# Patient Record
Sex: MALE | Race: WHITE | HISPANIC OR LATINO | Employment: FULL TIME | URBAN - METROPOLITAN AREA
[De-identification: names, ages, dates, MRNs, and addresses within clinical notes are randomized per-mention and may not be internally consistent; named-entity substitution may affect disease eponyms.]

---

## 2020-06-09 ENCOUNTER — OCCUPATIONAL HEALTH (OUTPATIENT)
Dept: URGENT CARE | Facility: CLINIC | Age: 20
End: 2020-06-09

## 2020-06-09 VITALS
WEIGHT: 144 LBS | DIASTOLIC BLOOD PRESSURE: 70 MMHG | HEART RATE: 72 BPM | TEMPERATURE: 98 F | HEIGHT: 66 IN | SYSTOLIC BLOOD PRESSURE: 108 MMHG | BODY MASS INDEX: 23.14 KG/M2

## 2020-06-09 DIAGNOSIS — Z02.1 PRE-EMPLOYMENT EXAMINATION: Primary | ICD-10-CM

## 2020-06-09 PROCEDURE — 80305 DRUG TEST PRSMV DIR OPT OBS: CPT | Mod: S$GLB,,, | Performed by: PHYSICIAN ASSISTANT

## 2020-06-09 PROCEDURE — 99499 UNLISTED E&M SERVICE: CPT | Mod: S$GLB,,, | Performed by: PHYSICIAN ASSISTANT

## 2020-06-09 PROCEDURE — 80305 OOH NON-DOT DRUG SCREEN: ICD-10-PCS | Mod: S$GLB,,, | Performed by: PHYSICIAN ASSISTANT

## 2020-06-09 PROCEDURE — 99499 PHYSICAL, BASIC COMPLEXITY: ICD-10-PCS | Mod: S$GLB,,, | Performed by: PHYSICIAN ASSISTANT

## 2020-10-27 ENCOUNTER — OFFICE VISIT (OUTPATIENT)
Dept: URGENT CARE | Facility: CLINIC | Age: 20
End: 2020-10-27
Payer: OTHER MISCELLANEOUS

## 2020-10-27 VITALS
SYSTOLIC BLOOD PRESSURE: 134 MMHG | DIASTOLIC BLOOD PRESSURE: 82 MMHG | WEIGHT: 160 LBS | HEIGHT: 65 IN | OXYGEN SATURATION: 98 % | HEART RATE: 95 BPM | TEMPERATURE: 98 F | RESPIRATION RATE: 16 BRPM | BODY MASS INDEX: 26.66 KG/M2

## 2020-10-27 DIAGNOSIS — M54.9 DORSALGIA, UNSPECIFIED: ICD-10-CM

## 2020-10-27 DIAGNOSIS — M54.32 SCIATICA, LEFT SIDE: Primary | ICD-10-CM

## 2020-10-27 PROCEDURE — 99203 PR OFFICE/OUTPT VISIT, NEW, LEVL III, 30-44 MIN: ICD-10-PCS | Mod: S$GLB,,, | Performed by: PHYSICIAN ASSISTANT

## 2020-10-27 PROCEDURE — 72110 XR LUMBAR SPINE COMPLETE 5 VIEW: ICD-10-PCS | Mod: FY,S$GLB,, | Performed by: RADIOLOGY

## 2020-10-27 PROCEDURE — 99203 OFFICE O/P NEW LOW 30 MIN: CPT | Mod: S$GLB,,, | Performed by: PHYSICIAN ASSISTANT

## 2020-10-27 PROCEDURE — 72110 X-RAY EXAM L-2 SPINE 4/>VWS: CPT | Mod: FY,S$GLB,, | Performed by: RADIOLOGY

## 2020-10-27 NOTE — LETTER
[facility]  [address], [address]  [facility] [address]  Phone: [phone]  Fax: [phone]  [facility]: [email or web address]    Pt Name: Hernandez Richmond  Injury Date: 09/20/2020   Employee ID: [id number] Date of First Treatment: 10/27/2020   Company: [employer]      Appointment Time: 10:35 AM Arrived: 10:48am   Provider: Endy Paige PA-C Time Out:1:30pm     Office Treatment:   1. Sciatica, left side    2. Dorsalgia, unspecified          Patient Instructions: Daily home exercises/warm soaks(You may take over-the-counter ibuprofen and/or Tylenol as needed for pain.  Follow-up with your doctor of choice for evaluation and management.)    Restrictions: Regular Duty, Discharged from Occupational Health     Return Appointment: Discharged  SB

## 2020-10-27 NOTE — PROGRESS NOTES
Subjective:       Patient ID: Hernandez Richmond is a 20 y.o. male.    Chief Complaint: Leg Pain (LT)    New  LT Leg Pain ( DOI 09- ) No known incident to cause leg pain, just started hurting. Pain score 8/10 with complaints of Constant Spasm which increases with prolonged standing, Pain with walking, No numbness/tingling or swelling. SH    20-year-old male presents with left lateral hip and left leg pain for approximately 1 month.  Patient denies trauma or any activity inciting pain.  Patient also reports intermittent tingling of the left foot.  Patient denies back pain, bowel or bladder incontinence or saddle paresthesias.  Patient denies previous back or left leg injury.  Patient has taken no medicines for the pain.  Patient works for [employer].    Leg Pain   The incident occurred more than 1 week ago. The incident occurred at work. There was no injury mechanism. The pain is present in the left leg. The pain is at a severity of 8/10. The pain is mild. The pain has been constant since onset. Associated symptoms include tingling (left foot). Pertinent negatives include no numbness. He reports no foreign bodies present. The symptoms are aggravated by movement and weight bearing.       Constitution: Negative for chills, fatigue and fever.   HENT: Negative for congestion and sore throat.    Neck: Negative for painful lymph nodes.   Cardiovascular: Negative for chest pain and leg swelling.   Eyes: Negative for double vision and blurred vision.   Respiratory: Negative for cough and shortness of breath.    Gastrointestinal: Negative for nausea, vomiting and diarrhea.   Genitourinary: Negative for dysuria, frequency and urgency.   Musculoskeletal: Positive for pain. Negative for trauma, joint pain, joint swelling, back pain, muscle cramps and muscle ache.   Skin: Negative for color change, pale and rash.   Allergic/Immunologic: Negative for seasonal allergies.   Neurological: Negative for dizziness, history  of vertigo, light-headedness, passing out, headaches, numbness and tingling.   Hematologic/Lymphatic: Negative for swollen lymph nodes, easy bruising/bleeding and history of blood clots. Does not bruise/bleed easily.   Psychiatric/Behavioral: Negative for nervous/anxious, sleep disturbance and depression. The patient is not nervous/anxious.         Objective:      Physical Exam  Vitals signs and nursing note reviewed. Exam conducted with a chaperone present.   Constitutional:       General: He is not in acute distress.     Appearance: Normal appearance. He is well-developed.   HENT:      Head: Normocephalic and atraumatic.      Right Ear: Hearing and external ear normal.      Left Ear: Hearing and external ear normal.      Nose: Nose normal. No nasal deformity.   Eyes:      General: Lids are normal.      Conjunctiva/sclera: Conjunctivae normal.      Right eye: Right conjunctiva is not injected.      Left eye: Left conjunctiva is not injected.   Neck:      Musculoskeletal: Normal range of motion. No spinous process tenderness or muscular tenderness.      Trachea: Trachea normal.   Cardiovascular:      Pulses: Normal pulses.           Dorsalis pedis pulses are 2+ on the right side and 2+ on the left side.        Posterior tibial pulses are 2+ on the right side and 2+ on the left side.   Pulmonary:      Effort: Pulmonary effort is normal. No respiratory distress.      Breath sounds: No stridor.   Musculoskeletal:      Left hip: He exhibits normal range of motion, normal strength and no tenderness.      Left knee: He exhibits normal range of motion. No tenderness found.      Cervical back: Normal.      Thoracic back: Normal.      Lumbar back: He exhibits normal range of motion, no tenderness, no deformity and normal pulse.      Left lower leg: He exhibits no tenderness and no bony tenderness.      Comments: Pain in left lateral hip and left leg with SLR.    Skin:     General: Skin is warm and dry.      Findings: No  abrasion or bruising.   Neurological:      Mental Status: He is alert.      GCS: GCS eye subscore is 4. GCS verbal subscore is 5. GCS motor subscore is 6.      Sensory: No sensory deficit.      Deep Tendon Reflexes:      Reflex Scores:       Patellar reflexes are 3+ on the right side and 3+ on the left side.       Achilles reflexes are 2+ on the right side and 2+ on the left side.     Comments: SLR positive LLE at 30 degrees.  Negative RLE   Psychiatric:         Attention and Perception: He is attentive.         Speech: Speech normal.         Behavior: Behavior normal.         Thought Content: Thought content normal.           X-ray Lumbar Spine Complete 5 View    Result Date: 10/27/2020  EXAMINATION: XR LUMBAR SPINE COMPLETE 5 VIEW CLINICAL HISTORY: Back pain or radiculopathy, < 6 wks, uncomplicated;WORKERS COMP;Sciatica, left side TECHNIQUE: AP, lateral, spot and bilateral oblique views of the lumbar spine were performed. COMPARISON: None FINDINGS: Vertebral bodies are intact.  Disc spaces are maintained and no bony abnormalities are detected.  There is some pleural thick material in loops of bowel in the pelvis probably representing medication in the colon.     No acute findings Electronically signed by: Endy Farr MD Date:    10/27/2020 Time:    13:07    Assessment:       1. Sciatica, left side    2. Dorsalgia, unspecified        Plan:       There is no link to work related activity causing patient's pain.  Patient instructed to follow with his doctor of choice for evaluation management.     Patient Instructions: Daily home exercises/warm soaks(You may take over-the-counter ibuprofen and/or Tylenol as needed for pain.  Follow-up with your doctor of choice for evaluation and management.)   Restrictions: Regular Duty, Discharged from Occupational Health  Follow up if symptoms worsen or fail to improve.        .

## 2025-05-05 ENCOUNTER — HOSPITAL ENCOUNTER (OUTPATIENT)
Facility: HOSPITAL | Age: 25
Discharge: HOME | End: 2025-05-07
Attending: EMERGENCY MEDICINE | Admitting: SURGERY
Payer: COMMERCIAL

## 2025-05-05 DIAGNOSIS — R10.9 ABDOMINAL PAIN, UNSPECIFIED ABDOMINAL LOCATION: ICD-10-CM

## 2025-05-05 DIAGNOSIS — K37 APPENDICITIS, UNSPECIFIED APPENDICITIS TYPE: Primary | ICD-10-CM

## 2025-05-05 LAB
ABSOLUTE EOSINOPHIL (OHS): 0.01 K/UL
ABSOLUTE MONOCYTE (OHS): 1.1 K/UL (ref 0.3–1)
ABSOLUTE NEUTROPHIL COUNT (OHS): 14.38 K/UL (ref 1.8–7.7)
ALBUMIN SERPL BCP-MCNC: 4.5 G/DL (ref 3.5–5.2)
ALP SERPL-CCNC: 87 UNIT/L (ref 40–150)
ALT SERPL W/O P-5'-P-CCNC: 37 UNIT/L (ref 10–44)
ANION GAP (OHS): 10 MMOL/L (ref 8–16)
AST SERPL-CCNC: 17 UNIT/L (ref 11–45)
BASOPHILS # BLD AUTO: 0.04 K/UL
BASOPHILS NFR BLD AUTO: 0.2 %
BILIRUB SERPL-MCNC: 1 MG/DL (ref 0.1–1)
BILIRUB UR QL STRIP.AUTO: NEGATIVE
BUN SERPL-MCNC: 9 MG/DL (ref 6–20)
CALCIUM SERPL-MCNC: 9.8 MG/DL (ref 8.7–10.5)
CHLORIDE SERPL-SCNC: 103 MMOL/L (ref 95–110)
CLARITY UR: CLEAR
CO2 SERPL-SCNC: 20 MMOL/L (ref 23–29)
COLOR UR AUTO: YELLOW
CREAT SERPL-MCNC: 0.8 MG/DL (ref 0.5–1.4)
ERYTHROCYTE [DISTWIDTH] IN BLOOD BY AUTOMATED COUNT: 12 % (ref 11.5–14.5)
GFR SERPLBLD CREATININE-BSD FMLA CKD-EPI: >60 ML/MIN/1.73/M2
GLUCOSE SERPL-MCNC: 94 MG/DL (ref 70–110)
GLUCOSE UR QL STRIP: NEGATIVE
HCT VFR BLD AUTO: 43.5 % (ref 40–54)
HGB BLD-MCNC: 15.1 GM/DL (ref 14–18)
HGB UR QL STRIP: NEGATIVE
HOLD SPECIMEN: NORMAL
HOLD SPECIMEN: NORMAL
IMM GRANULOCYTES # BLD AUTO: 0.07 K/UL (ref 0–0.04)
IMM GRANULOCYTES NFR BLD AUTO: 0.4 % (ref 0–0.5)
KETONES UR QL STRIP: ABNORMAL
LEUKOCYTE ESTERASE UR QL STRIP: NEGATIVE
LIPASE SERPL-CCNC: 21 U/L (ref 4–60)
LYMPHOCYTES # BLD AUTO: 2.31 K/UL (ref 1–4.8)
MCH RBC QN AUTO: 29.9 PG (ref 27–31)
MCHC RBC AUTO-ENTMCNC: 34.7 G/DL (ref 32–36)
MCV RBC AUTO: 86 FL (ref 82–98)
NITRITE UR QL STRIP: NEGATIVE
NUCLEATED RBC (/100WBC) (OHS): 0 /100 WBC
PH UR STRIP: 6 [PH]
PLATELET # BLD AUTO: 228 K/UL (ref 150–450)
PMV BLD AUTO: 9.4 FL (ref 9.2–12.9)
POTASSIUM SERPL-SCNC: 3.9 MMOL/L (ref 3.5–5.1)
PROT SERPL-MCNC: 8.3 GM/DL (ref 6–8.4)
PROT UR QL STRIP: NEGATIVE
RBC # BLD AUTO: 5.05 M/UL (ref 4.6–6.2)
RELATIVE EOSINOPHIL (OHS): 0.1 %
RELATIVE LYMPHOCYTE (OHS): 12.9 % (ref 18–48)
RELATIVE MONOCYTE (OHS): 6.1 % (ref 4–15)
RELATIVE NEUTROPHIL (OHS): 80.3 % (ref 38–73)
SODIUM SERPL-SCNC: 133 MMOL/L (ref 136–145)
SP GR UR STRIP: 1.02
UROBILINOGEN UR STRIP-ACNC: NEGATIVE EU/DL
WBC # BLD AUTO: 17.91 K/UL (ref 3.9–12.7)

## 2025-05-05 PROCEDURE — G0378 HOSPITAL OBSERVATION PER HR: HCPCS

## 2025-05-05 PROCEDURE — 81003 URINALYSIS AUTO W/O SCOPE: CPT | Performed by: EMERGENCY MEDICINE

## 2025-05-05 PROCEDURE — 25000003 PHARM REV CODE 250: Performed by: EMERGENCY MEDICINE

## 2025-05-05 PROCEDURE — 85025 COMPLETE CBC W/AUTO DIFF WBC: CPT | Performed by: EMERGENCY MEDICINE

## 2025-05-05 PROCEDURE — 80053 COMPREHEN METABOLIC PANEL: CPT | Performed by: EMERGENCY MEDICINE

## 2025-05-05 PROCEDURE — 96365 THER/PROPH/DIAG IV INF INIT: CPT

## 2025-05-05 PROCEDURE — 83690 ASSAY OF LIPASE: CPT | Performed by: EMERGENCY MEDICINE

## 2025-05-05 PROCEDURE — 63600175 PHARM REV CODE 636 W HCPCS: Performed by: EMERGENCY MEDICINE

## 2025-05-05 PROCEDURE — 99285 EMERGENCY DEPT VISIT HI MDM: CPT | Mod: 25

## 2025-05-05 PROCEDURE — 36415 COLL VENOUS BLD VENIPUNCTURE: CPT | Performed by: SURGERY

## 2025-05-05 PROCEDURE — 25500020 PHARM REV CODE 255: Performed by: EMERGENCY MEDICINE

## 2025-05-05 PROCEDURE — 96375 TX/PRO/DX INJ NEW DRUG ADDON: CPT

## 2025-05-05 RX ORDER — ONDANSETRON HYDROCHLORIDE 2 MG/ML
4 INJECTION, SOLUTION INTRAVENOUS EVERY 6 HOURS PRN
Status: DISCONTINUED | OUTPATIENT
Start: 2025-05-05 | End: 2025-05-07 | Stop reason: HOSPADM

## 2025-05-05 RX ORDER — LIDOCAINE HYDROCHLORIDE 10 MG/ML
1 INJECTION, SOLUTION EPIDURAL; INFILTRATION; INTRACAUDAL; PERINEURAL ONCE AS NEEDED
Status: DISCONTINUED | OUTPATIENT
Start: 2025-05-05 | End: 2025-05-07 | Stop reason: HOSPADM

## 2025-05-05 RX ORDER — ACETAMINOPHEN 325 MG/1
650 TABLET ORAL EVERY 6 HOURS
Status: DISCONTINUED | OUTPATIENT
Start: 2025-05-06 | End: 2025-05-06

## 2025-05-05 RX ORDER — ONDANSETRON HYDROCHLORIDE 2 MG/ML
4 INJECTION, SOLUTION INTRAVENOUS EVERY 6 HOURS PRN
Status: DISCONTINUED | OUTPATIENT
Start: 2025-05-05 | End: 2025-05-05

## 2025-05-05 RX ORDER — PROCHLORPERAZINE EDISYLATE 5 MG/ML
5 INJECTION INTRAMUSCULAR; INTRAVENOUS EVERY 6 HOURS PRN
Status: DISCONTINUED | OUTPATIENT
Start: 2025-05-05 | End: 2025-05-07 | Stop reason: HOSPADM

## 2025-05-05 RX ORDER — SODIUM CHLORIDE 9 MG/ML
1000 INJECTION, SOLUTION INTRAVENOUS
Status: COMPLETED | OUTPATIENT
Start: 2025-05-05 | End: 2025-05-05

## 2025-05-05 RX ORDER — MORPHINE SULFATE 4 MG/ML
4 INJECTION, SOLUTION INTRAMUSCULAR; INTRAVENOUS
Refills: 0 | Status: COMPLETED | OUTPATIENT
Start: 2025-05-05 | End: 2025-05-05

## 2025-05-05 RX ORDER — ONDANSETRON HYDROCHLORIDE 2 MG/ML
4 INJECTION, SOLUTION INTRAVENOUS
Status: COMPLETED | OUTPATIENT
Start: 2025-05-05 | End: 2025-05-05

## 2025-05-05 RX ORDER — MORPHINE SULFATE 4 MG/ML
4 INJECTION, SOLUTION INTRAMUSCULAR; INTRAVENOUS EVERY 4 HOURS PRN
Refills: 0 | Status: DISCONTINUED | OUTPATIENT
Start: 2025-05-05 | End: 2025-05-06

## 2025-05-05 RX ORDER — MORPHINE SULFATE 2 MG/ML
2 INJECTION, SOLUTION INTRAMUSCULAR; INTRAVENOUS EVERY 4 HOURS PRN
Status: DISCONTINUED | OUTPATIENT
Start: 2025-05-05 | End: 2025-05-05

## 2025-05-05 RX ORDER — SODIUM CHLORIDE 9 MG/ML
1000 INJECTION, SOLUTION INTRAVENOUS
Status: DISCONTINUED | OUTPATIENT
Start: 2025-05-05 | End: 2025-05-05

## 2025-05-05 RX ORDER — SODIUM CHLORIDE, SODIUM LACTATE, POTASSIUM CHLORIDE, CALCIUM CHLORIDE 600; 310; 30; 20 MG/100ML; MG/100ML; MG/100ML; MG/100ML
INJECTION, SOLUTION INTRAVENOUS CONTINUOUS
Status: DISCONTINUED | OUTPATIENT
Start: 2025-05-05 | End: 2025-05-06

## 2025-05-05 RX ORDER — SODIUM CHLORIDE 0.9 % (FLUSH) 0.9 %
10 SYRINGE (ML) INJECTION
Status: DISCONTINUED | OUTPATIENT
Start: 2025-05-05 | End: 2025-05-07 | Stop reason: HOSPADM

## 2025-05-05 RX ORDER — MORPHINE SULFATE 2 MG/ML
2 INJECTION, SOLUTION INTRAMUSCULAR; INTRAVENOUS EVERY 4 HOURS PRN
Refills: 0 | Status: DISCONTINUED | OUTPATIENT
Start: 2025-05-05 | End: 2025-05-06

## 2025-05-05 RX ADMIN — SODIUM CHLORIDE 1000 ML: 9 INJECTION, SOLUTION INTRAVENOUS at 08:05

## 2025-05-05 RX ADMIN — PIPERACILLIN AND TAZOBACTAM 4.5 G: 4; .5 INJECTION, POWDER, LYOPHILIZED, FOR SOLUTION INTRAVENOUS; PARENTERAL at 08:05

## 2025-05-05 RX ADMIN — MORPHINE SULFATE 4 MG: 4 INJECTION INTRAVENOUS at 08:05

## 2025-05-05 RX ADMIN — SODIUM CHLORIDE, POTASSIUM CHLORIDE, SODIUM LACTATE AND CALCIUM CHLORIDE 1000 ML: 600; 310; 30; 20 INJECTION, SOLUTION INTRAVENOUS at 10:05

## 2025-05-05 RX ADMIN — IOHEXOL 75 ML: 350 INJECTION, SOLUTION INTRAVENOUS at 07:05

## 2025-05-05 RX ADMIN — ONDANSETRON 4 MG: 2 INJECTION INTRAMUSCULAR; INTRAVENOUS at 08:05

## 2025-05-06 ENCOUNTER — ANESTHESIA (OUTPATIENT)
Dept: SURGERY | Facility: HOSPITAL | Age: 25
End: 2025-05-06
Payer: COMMERCIAL

## 2025-05-06 ENCOUNTER — ANESTHESIA EVENT (OUTPATIENT)
Dept: SURGERY | Facility: HOSPITAL | Age: 25
End: 2025-05-06
Payer: COMMERCIAL

## 2025-05-06 PROBLEM — K37 APPENDICITIS: Status: ACTIVE | Noted: 2025-05-06

## 2025-05-06 LAB
ABSOLUTE EOSINOPHIL (OHS): 0.09 K/UL
ABSOLUTE MONOCYTE (OHS): 1.09 K/UL (ref 0.3–1)
ABSOLUTE NEUTROPHIL COUNT (OHS): 10.83 K/UL (ref 1.8–7.7)
ALBUMIN SERPL BCP-MCNC: 4 G/DL (ref 3.5–5.2)
ALP SERPL-CCNC: 82 UNIT/L (ref 40–150)
ALT SERPL W/O P-5'-P-CCNC: 32 UNIT/L (ref 10–44)
ANION GAP (OHS): 9 MMOL/L (ref 8–16)
AST SERPL-CCNC: 14 UNIT/L (ref 11–45)
BASOPHILS # BLD AUTO: 0.03 K/UL
BASOPHILS NFR BLD AUTO: 0.2 %
BILIRUB SERPL-MCNC: 1.2 MG/DL (ref 0.1–1)
BUN SERPL-MCNC: 8 MG/DL (ref 6–20)
CALCIUM SERPL-MCNC: 9.4 MG/DL (ref 8.7–10.5)
CHLORIDE SERPL-SCNC: 105 MMOL/L (ref 95–110)
CO2 SERPL-SCNC: 23 MMOL/L (ref 23–29)
CREAT SERPL-MCNC: 0.8 MG/DL (ref 0.5–1.4)
ERYTHROCYTE [DISTWIDTH] IN BLOOD BY AUTOMATED COUNT: 12.2 % (ref 11.5–14.5)
GFR SERPLBLD CREATININE-BSD FMLA CKD-EPI: >60 ML/MIN/1.73/M2
GLUCOSE SERPL-MCNC: 94 MG/DL (ref 70–110)
HCT VFR BLD AUTO: 43.4 % (ref 40–54)
HGB BLD-MCNC: 14.7 GM/DL (ref 14–18)
HOLD SPECIMEN: NORMAL
IMM GRANULOCYTES # BLD AUTO: 0.04 K/UL (ref 0–0.04)
IMM GRANULOCYTES NFR BLD AUTO: 0.3 % (ref 0–0.5)
LYMPHOCYTES # BLD AUTO: 2.94 K/UL (ref 1–4.8)
MAGNESIUM SERPL-MCNC: 2 MG/DL (ref 1.6–2.6)
MCH RBC QN AUTO: 29.5 PG (ref 27–31)
MCHC RBC AUTO-ENTMCNC: 33.9 G/DL (ref 32–36)
MCV RBC AUTO: 87 FL (ref 82–98)
NUCLEATED RBC (/100WBC) (OHS): 0 /100 WBC
PHOSPHATE SERPL-MCNC: 3.6 MG/DL (ref 2.7–4.5)
PLATELET # BLD AUTO: 219 K/UL (ref 150–450)
PMV BLD AUTO: 9.6 FL (ref 9.2–12.9)
POTASSIUM SERPL-SCNC: 3.8 MMOL/L (ref 3.5–5.1)
PROT SERPL-MCNC: 7.5 GM/DL (ref 6–8.4)
RBC # BLD AUTO: 4.98 M/UL (ref 4.6–6.2)
RELATIVE EOSINOPHIL (OHS): 0.6 %
RELATIVE LYMPHOCYTE (OHS): 19.6 % (ref 18–48)
RELATIVE MONOCYTE (OHS): 7.3 % (ref 4–15)
RELATIVE NEUTROPHIL (OHS): 72 % (ref 38–73)
SODIUM SERPL-SCNC: 137 MMOL/L (ref 136–145)
WBC # BLD AUTO: 15.02 K/UL (ref 3.9–12.7)

## 2025-05-06 PROCEDURE — 37000008 HC ANESTHESIA 1ST 15 MINUTES: Performed by: SURGERY

## 2025-05-06 PROCEDURE — G0378 HOSPITAL OBSERVATION PER HR: HCPCS

## 2025-05-06 PROCEDURE — 83735 ASSAY OF MAGNESIUM: CPT

## 2025-05-06 PROCEDURE — 96361 HYDRATE IV INFUSION ADD-ON: CPT

## 2025-05-06 PROCEDURE — 25000003 PHARM REV CODE 250

## 2025-05-06 PROCEDURE — 36000708 HC OR TIME LEV III 1ST 15 MIN: Performed by: SURGERY

## 2025-05-06 PROCEDURE — 84100 ASSAY OF PHOSPHORUS: CPT

## 2025-05-06 PROCEDURE — 80053 COMPREHEN METABOLIC PANEL: CPT

## 2025-05-06 PROCEDURE — 85025 COMPLETE CBC W/AUTO DIFF WBC: CPT

## 2025-05-06 PROCEDURE — 88304 TISSUE EXAM BY PATHOLOGIST: CPT | Mod: TC | Performed by: SURGERY

## 2025-05-06 PROCEDURE — 63600175 PHARM REV CODE 636 W HCPCS: Performed by: EMERGENCY MEDICINE

## 2025-05-06 PROCEDURE — 25000003 PHARM REV CODE 250: Performed by: NURSE ANESTHETIST, CERTIFIED REGISTERED

## 2025-05-06 PROCEDURE — 63600175 PHARM REV CODE 636 W HCPCS: Performed by: SURGERY

## 2025-05-06 PROCEDURE — 63600175 PHARM REV CODE 636 W HCPCS

## 2025-05-06 PROCEDURE — 36415 COLL VENOUS BLD VENIPUNCTURE: CPT

## 2025-05-06 PROCEDURE — 71000033 HC RECOVERY, INTIAL HOUR: Performed by: SURGERY

## 2025-05-06 PROCEDURE — 96366 THER/PROPH/DIAG IV INF ADDON: CPT

## 2025-05-06 PROCEDURE — 37000009 HC ANESTHESIA EA ADD 15 MINS: Performed by: SURGERY

## 2025-05-06 PROCEDURE — 36000709 HC OR TIME LEV III EA ADD 15 MIN: Performed by: SURGERY

## 2025-05-06 PROCEDURE — 94761 N-INVAS EAR/PLS OXIMETRY MLT: CPT

## 2025-05-06 PROCEDURE — 27201423 OPTIME MED/SURG SUP & DEVICES STERILE SUPPLY: Performed by: SURGERY

## 2025-05-06 PROCEDURE — 99900035 HC TECH TIME PER 15 MIN (STAT)

## 2025-05-06 PROCEDURE — 44970 LAPAROSCOPY APPENDECTOMY: CPT | Mod: ,,, | Performed by: SURGERY

## 2025-05-06 PROCEDURE — 25000003 PHARM REV CODE 250: Performed by: EMERGENCY MEDICINE

## 2025-05-06 PROCEDURE — 99222 1ST HOSP IP/OBS MODERATE 55: CPT | Mod: 57,,, | Performed by: SURGERY

## 2025-05-06 PROCEDURE — 88304 TISSUE EXAM BY PATHOLOGIST: CPT | Mod: 26,,, | Performed by: PATHOLOGY

## 2025-05-06 PROCEDURE — 63600175 PHARM REV CODE 636 W HCPCS: Performed by: NURSE ANESTHETIST, CERTIFIED REGISTERED

## 2025-05-06 RX ORDER — OXYCODONE AND ACETAMINOPHEN 5; 325 MG/1; MG/1
1 TABLET ORAL EVERY 4 HOURS PRN
Status: DISCONTINUED | OUTPATIENT
Start: 2025-05-06 | End: 2025-05-07 | Stop reason: HOSPADM

## 2025-05-06 RX ORDER — GLUCAGON 1 MG
1 KIT INJECTION
Status: DISCONTINUED | OUTPATIENT
Start: 2025-05-06 | End: 2025-05-06

## 2025-05-06 RX ORDER — ROCURONIUM BROMIDE 10 MG/ML
INJECTION, SOLUTION INTRAVENOUS
Status: DISCONTINUED | OUTPATIENT
Start: 2025-05-06 | End: 2025-05-06

## 2025-05-06 RX ORDER — PROPOFOL 10 MG/ML
VIAL (ML) INTRAVENOUS
Status: DISCONTINUED | OUTPATIENT
Start: 2025-05-06 | End: 2025-05-06

## 2025-05-06 RX ORDER — LIDOCAINE HYDROCHLORIDE 10 MG/ML
INJECTION, SOLUTION INFILTRATION; PERINEURAL
Status: DISCONTINUED | OUTPATIENT
Start: 2025-05-06 | End: 2025-05-06 | Stop reason: HOSPADM

## 2025-05-06 RX ORDER — PROCHLORPERAZINE EDISYLATE 5 MG/ML
5 INJECTION INTRAMUSCULAR; INTRAVENOUS EVERY 30 MIN PRN
Status: DISCONTINUED | OUTPATIENT
Start: 2025-05-06 | End: 2025-05-06

## 2025-05-06 RX ORDER — MIDAZOLAM HYDROCHLORIDE 1 MG/ML
INJECTION INTRAMUSCULAR; INTRAVENOUS
Status: DISCONTINUED | OUTPATIENT
Start: 2025-05-06 | End: 2025-05-06

## 2025-05-06 RX ORDER — BUPIVACAINE HYDROCHLORIDE 5 MG/ML
INJECTION, SOLUTION PERINEURAL
Status: DISCONTINUED | OUTPATIENT
Start: 2025-05-06 | End: 2025-05-06 | Stop reason: HOSPADM

## 2025-05-06 RX ORDER — FENTANYL CITRATE 50 UG/ML
INJECTION, SOLUTION INTRAMUSCULAR; INTRAVENOUS
Status: DISCONTINUED | OUTPATIENT
Start: 2025-05-06 | End: 2025-05-06

## 2025-05-06 RX ORDER — DEXAMETHASONE SODIUM PHOSPHATE 4 MG/ML
INJECTION, SOLUTION INTRA-ARTICULAR; INTRALESIONAL; INTRAMUSCULAR; INTRAVENOUS; SOFT TISSUE
Status: DISCONTINUED | OUTPATIENT
Start: 2025-05-06 | End: 2025-05-06

## 2025-05-06 RX ORDER — SODIUM CHLORIDE 0.9 % (FLUSH) 0.9 %
10 SYRINGE (ML) INJECTION DAILY PRN
Status: DISCONTINUED | OUTPATIENT
Start: 2025-05-06 | End: 2025-05-06

## 2025-05-06 RX ORDER — OXYCODONE HYDROCHLORIDE 5 MG/1
5 TABLET ORAL
Status: DISCONTINUED | OUTPATIENT
Start: 2025-05-06 | End: 2025-05-06

## 2025-05-06 RX ORDER — LIDOCAINE HYDROCHLORIDE 20 MG/ML
INJECTION INTRAVENOUS
Status: DISCONTINUED | OUTPATIENT
Start: 2025-05-06 | End: 2025-05-06

## 2025-05-06 RX ORDER — ACETAMINOPHEN 10 MG/ML
INJECTION, SOLUTION INTRAVENOUS
Status: DISCONTINUED | OUTPATIENT
Start: 2025-05-06 | End: 2025-05-06

## 2025-05-06 RX ORDER — HYDROMORPHONE HYDROCHLORIDE 2 MG/ML
0.5 INJECTION, SOLUTION INTRAMUSCULAR; INTRAVENOUS; SUBCUTANEOUS EVERY 5 MIN PRN
Status: DISCONTINUED | OUTPATIENT
Start: 2025-05-06 | End: 2025-05-06

## 2025-05-06 RX ORDER — KETOROLAC TROMETHAMINE 30 MG/ML
INJECTION, SOLUTION INTRAMUSCULAR; INTRAVENOUS
Status: DISCONTINUED | OUTPATIENT
Start: 2025-05-06 | End: 2025-05-06

## 2025-05-06 RX ADMIN — ACETAMINOPHEN 1000 MG: 10 INJECTION, SOLUTION INTRAVENOUS at 10:05

## 2025-05-06 RX ADMIN — ACETAMINOPHEN 650 MG: 325 TABLET ORAL at 12:05

## 2025-05-06 RX ADMIN — DEXAMETHASONE SODIUM PHOSPHATE 8 MG: 4 INJECTION, SOLUTION INTRA-ARTICULAR; INTRALESIONAL; INTRAMUSCULAR; INTRAVENOUS; SOFT TISSUE at 10:05

## 2025-05-06 RX ADMIN — PIPERACILLIN AND TAZOBACTAM 4.5 G: 4; .5 INJECTION, POWDER, LYOPHILIZED, FOR SOLUTION INTRAVENOUS; PARENTERAL at 05:05

## 2025-05-06 RX ADMIN — FENTANYL CITRATE 50 MCG: 0.05 INJECTION, SOLUTION INTRAMUSCULAR; INTRAVENOUS at 11:05

## 2025-05-06 RX ADMIN — FENTANYL CITRATE 150 MCG: 0.05 INJECTION, SOLUTION INTRAMUSCULAR; INTRAVENOUS at 10:05

## 2025-05-06 RX ADMIN — LIDOCAINE HYDROCHLORIDE 100 MG: 20 INJECTION, SOLUTION INTRAVENOUS at 10:05

## 2025-05-06 RX ADMIN — PROPOFOL 50 MG: 10 INJECTION, EMULSION INTRAVENOUS at 10:05

## 2025-05-06 RX ADMIN — SUGAMMADEX 200 MG: 100 INJECTION, SOLUTION INTRAVENOUS at 11:05

## 2025-05-06 RX ADMIN — ACETAMINOPHEN 650 MG: 325 TABLET ORAL at 05:05

## 2025-05-06 RX ADMIN — SODIUM CHLORIDE, SODIUM LACTATE, POTASSIUM CHLORIDE, AND CALCIUM CHLORIDE: .6; .31; .03; .02 INJECTION, SOLUTION INTRAVENOUS at 10:05

## 2025-05-06 RX ADMIN — ROCURONIUM BROMIDE 50 MG: 10 INJECTION, SOLUTION INTRAVENOUS at 10:05

## 2025-05-06 RX ADMIN — FENTANYL CITRATE 50 MCG: 0.05 INJECTION, SOLUTION INTRAMUSCULAR; INTRAVENOUS at 10:05

## 2025-05-06 RX ADMIN — ONDANSETRON 8 MG: 2 INJECTION, SOLUTION INTRAMUSCULAR; INTRAVENOUS at 11:05

## 2025-05-06 RX ADMIN — SODIUM CHLORIDE, POTASSIUM CHLORIDE, SODIUM LACTATE AND CALCIUM CHLORIDE: 600; 310; 30; 20 INJECTION, SOLUTION INTRAVENOUS at 12:05

## 2025-05-06 RX ADMIN — MIDAZOLAM HYDROCHLORIDE 2 MG: 1 INJECTION, SOLUTION INTRAMUSCULAR; INTRAVENOUS at 10:05

## 2025-05-06 RX ADMIN — PROPOFOL 200 MG: 10 INJECTION, EMULSION INTRAVENOUS at 10:05

## 2025-05-06 NOTE — PLAN OF CARE
1232 Report given to shane santa,     1242 PACU d/c criteria met ,patient transported  to  524 with patient transport staff, Left hand 20 g IV  saline locked, Right AC 20 G Infusing. Patient denies pain. Patient AAO X4

## 2025-05-06 NOTE — ASSESSMENT & PLAN NOTE
24 y.o. male who presents with acute appendicitis.    Plan:  OR today for laparoscopic appendectomy  Consent signed  NPO  mIVF  Zosyn  SCDs  Anti-emetics prn  MMPC

## 2025-05-06 NOTE — PLAN OF CARE
Problem: Adult Inpatient Plan of Care  Goal: Plan of Care Review  Outcome: Progressing     VIRTUAL NURSE:  Cued into patient's room.  Video , Gabriella [id number], Permission received per patient to turn camera to view patient.  Introduced as VN for night shift that will be working with floor nurse and nursing assistant.  Educated patient on VN's role in patient care.  Plan of care reviewed with patient.  Education per flowsheet.   Informed patient that staff will round on them every 2 hours but to use call light for any other needs they may have; informed of fall risk and fall precautions.  Patient verbalized understanding.  Call light within reach; bed siderails up x2.  Opportunity given for questions and questions answered.  Admission assessment questions answered.  Patient denies complaints or any needs at this time.  Instructed to call for assistance.  Will cont to monitor and intervene as needed.    Labs, notes, orders, and careplan initiated.       05/05/25 2227   Admission Complete   Admission Complete by VN Complete      05/05/25 2227   Patient Request   Patient Requested spouse at bedside; no complaits or needs currently   Admission   Initial VN Admission Questions Complete   Communication Issues? Technical Issue   Shift   Virtual Nurse - Rounding Complete   Pain Management Interventions pain management plan reviewed with patient/caregiver   Virtual Nurse - Patient Verbalized Approval Of Camera Use;VN Rounding   Type of Frequent Check   Type Patient Rounds   Safety/Activity   Patient Rounds bed in low position;placement of personal items at bedside;bed wheels locked;call light in patient/parent reach;visualized patient;clutter free environment maintained   Safety Promotion/Fall Prevention assistive device/personal item within reach;commode/urinal/bedpan at bedside;high risk medications identified;Fall Risk reviewed with patient/family;diversional activities provided;medications  reviewed;nonskid shoes/socks when out of bed;room near unit station;supervised activity;Supervised toileting - stay within arms reach;instructed to call staff for mobility;side rails raised x 2   Safety Precautions emergency equipment at bedside   Positioning   Body Position neutral body alignment;neutral head position;position changed independently   Head of Bed (HOB) Positioning HOB at 60-90 degrees   Pain/Comfort/Sleep   Preferred Pain Scale number (Numeric Rating Pain Scale)   Comfort/Acceptable Pain Level 4   Pain Body Location - Side Right   Pain Body Location abdomen   Pain Rating (0-10): Rest 8  (states he just received pain medication)   POSS (Pasero Opioid-Induced Sed Scale) 1 - Awake and alert   Sleep/Rest/Relaxation no problem identified;awake

## 2025-05-06 NOTE — OP NOTE
PATIENT: Hernandez Richmond    MRN: [id number]    Date of Procedure:  05/06/2025    Surgeon: Scott Molina M.D.    Assistant: Anastacio Fermin M.D.    Procedure: Laparoscopic Appendectomy    Pre-Operative Diagnosis: Acute appendicitis     Post-Operative Diagnosis: same    Anesthesia: General    Specimen: Appendix    Complications: none    Estimated Blood Loss (EBL): minimal    INDICATIONS: The patient is a 24 y.o. male  who presents to the emergency room with clinical and radiologic evidence of acute appendicitis. The patient was counseled on their options for treatment and they expressed the desire to proceed with surgical intervention. The risks of the procedure were described to the patient including bleeding, infection, pain, scarring, wound complications including hernias, injury to local structures, potential need for further surgery, perioperative cardiac/pulmonary/thromboembolic events, and possible death.    PROCEDURE:  After surgical consent was obtained, the patient was transported to the operative theater and onto the operating room table in the supine position.  General endotracheal anesthesia was administered without difficulty.  Perioperative antibiotics were given and a time-out was performed in order to ensure the proper patient and procedure.  The patient's abdomen was prepped and draped in a standard sterile fashion.  An incision was made in the skin around the umbilicus and carried down bluntly to the fascia.  The fascia was sharply divided and the abdominal cavity was entered bluntly.  A trocar was inserted without difficulty and the abdomen was insufflated.  Two additional trocars were inserted under direct visualization and without issue. The patient was placed in the Trendelenburg position with the left side down.  The appendix was identified and found to be acutely inflamed and without evidence of rupture.  There was no purulent fluid.  The appendix was bluntly elevated off the retroperitoneum.  The  base of the appendix was identified and a window was made in the mesoappendix.  The appendiceal base was then stapled and divided using at Endo stapler with a tissue load.  The mesoappendix was similarly divided with a ligasure device  The appendix was placed in a specimen bag and removed from the field.  The right lower quadrant was irrigated out with sterile saline.  The staple line was intact.  We desufflated the abdomen under direct visualization and removed all trocars.  The fascial defect at the periumbilical incision was closed using a figure-of-eight 0 Vicryl suture.  The incisions were irrigated out with sterile saline and reinjected with local anesthetic.  The skin openings were closed using interrupted Monocryl stitches and covered with a sterile Band-Aid.  At the end of the procedure the instrument, lap, and needle counts were correct.  The patient was awoken from anesthesia having tolerated the procedure without difficulty and was returned to the PACU in a stable condition.  Any available family was updated and all questions were answered.    Scott Molina M.D., F.A.C.S.  [id number]  [facility]

## 2025-05-06 NOTE — PLAN OF CARE
The sw met with the pt to complete the assessment. The sw offered to use the SHONDA to translate for the pt  but the pt declined stating he understood the sw. The pt lives in [place] with his wife Juanita Molina [phone] and his son. The pt's independent with his ADL's and he doesn't use dme. The pt's employed as a . The pt still drives but his wife will transport home at d/c. The sw completed the white board in the pt's room with her name and contact info. The sw encouraged the pt to call if he has any further questions or concerns. The sw will continue to follow the pt throughout his transitions of care and will assist with any d/c needs. The pt states his sister is in route to come see him at the hospital.     [facility]  Discharge Assessment    Primary Care Provider: No, Primary Doctor     Discharge Assessment (most recent)       BRIEF DISCHARGE ASSESSMENT - 05/06/25 7564          Discharge Planning    Assessment Type Discharge Planning Brief Assessment (P)      Resource/Environmental Concerns none (P)      Support Systems Spouse/significant other;Children;Family members;Friends/neighbors (P)      Equipment Currently Used at Home none (P)      Current Living Arrangements home (P)      Patient/Family Anticipates Transition to home (P)      Patient/Family Anticipated Services at Transition none (P)      DME Needed Upon Discharge  none (P)      Discharge Plan A Home with family (P)      Discharge Plan B Home Health (P)         Physical Activity    On average, how many days per week do you engage in moderate to strenuous exercise (like a brisk walk)? Patient unable to answer (P)      On average, how many minutes do you engage in exercise at this level? Patient unable to answer (P)         Financial Resource Strain    How hard is it for you to pay for the very basics like food, housing, medical care, and heating? Patient unable to answer (P)         Housing Stability    In the last 12 months, was there  a time when you were not able to pay the mortgage or rent on time? Patient unable to answer (P)      At any time in the past 12 months, were you homeless or living in a shelter (including now)? Patient unable to answer (P)         Transportation Needs    In the past 12 months, has lack of transportation kept you from medical appointments or from getting medications? Patient unable to answer (P)      In the past 12 months, has lack of transportation kept you from meetings, work, or from getting things needed for daily living? Patient unable to answer (P)         Food Insecurity    Within the past 12 months, you worried that your food would run out before you got the money to buy more. Patient unable to answer (P)      Within the past 12 months, the food you bought just didn't last and you didn't have money to get more. Patient unable to answer (P)         Stress    Do you feel stress - tense, restless, nervous, or anxious, or unable to sleep at night because your mind is troubled all the time - these days? Patient unable to answer (P)         Social Isolation    How often do you feel lonely or isolated from those around you?  Patient unable to answer (P)         Alcohol Use    Q1: How often do you have a drink containing alcohol? Patient unable to answer (P)      Q2: How many drinks containing alcohol do you have on a typical day when you are drinking? Patient unable to answer (P)      Q3: How often do you have six or more drinks on one occasion? Patient unable to answer (P)         Utilities    In the past 12 months has the electric, gas, oil, or water company threatened to shut off services in your home? Patient unable to answer (P)         Health Literacy    How often do you need to have someone help you when you read instructions, pamphlets, or other written material from your doctor or pharmacy? Patient unable to respond (P)

## 2025-05-06 NOTE — ED PROVIDER NOTES
Encounter Date: 5/5/2025       History     Chief Complaint   Patient presents with    Abdominal Pain     RLQ abdominal pain that started yesterday afternoon. Endorses nausea, denies emesis. Tender to palpation.     HPI    Patient is a 24-year-old male no past medical history that is presenting for right lower quadrant pain.  As per patient, patient states that he has had worsening right lower quadrant pain for the past 2 days.  Patient has had ongoing nausea, decreased appetite.  Patient denies any vomiting.  Patient denies any fevers, chills, chest pains, shortness of breath, cough.    Review of patient's allergies indicates:  No Known Allergies  No past medical history on file.  No past surgical history on file.  No family history on file.  Social History[1]  Review of Systems   Constitutional:         No other system positives other than aforementioned as reported by patient       Physical Exam     Initial Vitals [05/05/25 1832]   BP Pulse Resp Temp SpO2   130/80 (!) 122 18 99.3 °F (37.4 °C) 98 %      MAP       --         Physical Exam    Vitals reviewed.  Constitutional: He appears well-developed and well-nourished. He is not diaphoretic. No distress.   Well-appearing 24-year-old male, no distress, appropriately conversation   Cardiovascular:  Normal rate, regular rhythm, normal heart sounds and intact distal pulses.     Exam reveals no gallop and no friction rub.       No murmur heard.  Pulmonary/Chest: Breath sounds normal. No respiratory distress. He has no wheezes. He has no rales.   Abdominal: Abdomen is soft. Bowel sounds are normal. He exhibits no distension. There is abdominal tenderness.   Moderate tenderness to palpation right lower quadrant.  No guarding, no rigidity. There is no rebound and no guarding.   Musculoskeletal:         General: No tenderness or edema. Normal range of motion.     Neurological: He is alert and oriented to person, place, and time. GCS score is 15.   Skin: Skin is warm and  dry. No rash noted. No erythema. No pallor.         ED Course   Procedures  Labs Reviewed   CBC W/ AUTO DIFFERENTIAL    Narrative:     The following orders were created for panel order CBC W/ AUTO DIFFERENTIAL.  Procedure                               Abnormality         Status                     ---------                               -----------         ------                     CBC with Differential[[id number]]                                                         Please view results for these tests on the individual orders.   COMPREHENSIVE METABOLIC PANEL   LIPASE   URINALYSIS, REFLEX TO URINE CULTURE   CBC WITH DIFFERENTIAL          Imaging Results               CT Abdomen Pelvis With IV Contrast NO Oral Contrast (Final result)  Result time 05/05/25 19:46:46      Final result by Frederic Salinas DO (05/05/25 19:46:46)                   Impression:      Acute, uncomplicated appendicitis.  No abscess.    This report was flagged in Epic as abnormal.      Electronically signed by: Frederic Salinas  Date:    05/05/2025  Time:    19:46               Narrative:    EXAMINATION:  CT ABDOMEN PELVIS WITH IV CONTRAST    CLINICAL HISTORY:  Abdominal abscess/infection suspected;    TECHNIQUE:  Axial CT images with sagittal and coronal reformats were obtained of the abdomen and pelvis from the hemidiaphragms through the symphysis pubis after the administration of 75mL Omnipaque 350.    COMPARISON:  None available.    FINDINGS:  Lung Bases: Clear.    Heart: Heart size is normal.  No pericardial effusion.    Liver: There is hypoattenuation of the hepatic parenchyma, compatible with hepatic steatosis.  The liver is otherwise unremarkable.  The portal vasculature is patent.    Biliary tract: No intrahepatic or extrahepatic biliary ductal dilatation.    Gallbladder: No radiodense gallstone. No wall thickening or pericholecystic fluid.    Pancreas: Normal. No pancreatic ductal dilatation.    Spleen: Normal size without focal  lesion.    Adrenals: Unremarkable.    Kidneys and urinary collecting systems: Normal.  No hydronephrosis or urolithiasis.    Lymph nodes: None enlarged.    Stomach and bowel: The stomach is normal.  Loops of small and large bowel are normal in caliber without evidence for inflammation or obstruction.  There is acute, uncomplicated appendicitis.  The appendix measures up to 1 cm in maximal diameter, and demonstrates a thick and enhancing wall.  There is periappendiceal fat stranding without evidence of a periappendiceal fluid collection.  There is minimal colonic diverticulosis without evidence of acute diverticulitis.    Peritoneum and mesentery: No ascites or free intraperitoneal air.  No abdominal fluid collection.    Vasculature: No aneurysm or significant atherosclerosis.    Urinary bladder: No wall thickening.    Reproductive organs: The prostate is unremarkable.    Body wall: No abnormality.    Musculoskeletal: No aggressive osseous lesion.                                       Medications   morphine injection 4 mg (has no administration in time range)   ondansetron injection 4 mg (has no administration in time range)   lactated ringers bolus 1,000 mL (has no administration in time range)   piperacillin-tazobactam (ZOSYN) 4.5 g in D5W 100 mL IVPB (MB+) (has no administration in time range)   0.9% NaCl infusion (has no administration in time range)   LIDOcaine (PF) 10 mg/ml (1%) injection 10 mg (has no administration in time range)   sodium chloride 0.9% flush 10 mL (has no administration in time range)   lactated ringers infusion (has no administration in time range)   ondansetron injection 4 mg (has no administration in time range)   prochlorperazine injection Soln 5 mg (has no administration in time range)   acetaminophen tablet 650 mg (has no administration in time range)   morphine injection 2 mg (has no administration in time range)   morphine injection 4 mg (has no administration in time range)   iohexoL  (OMNIPAQUE 350) injection 75 mL (75 mLs Intravenous Given 5/5/25 1929)     Medical Decision Making  1. Differential Diagnosis includes:  Appendicitis      2. Co Morbidities include: n/a   Increased patient risk: n/a      3. External notes reviewed:  Previous ED note      4. History sources independently obtained include:  Family      5. Discussion of management with:  General surgery      6. Independent intrepretation of tests include: n/a      7. Diagnostic tests or therapies considered but not ordered: n/a      8. Social determinants of health: n/a      9. Shared decision making includes:  Patient is a 24-year-old male presenting for right lower quadrant abdominal pain.  CT scan was done prior to laboratory workup.  CT scan shows uncomplicated appendicitis.  Discussed with general surgery who agrees to admit patient for further evaluation.  On admission patient is still pending lab results.  Discussed with general surgery and they are aware.  General surgery will follow up laboratory workup or any gross abnormalities.  Otherwise patient will be admitted for appendicitis.  Patient agrees with treatment and plan.    Amount and/or Complexity of Data Reviewed  Labs: ordered.  Radiology: ordered.    Risk  Prescription drug management.  Emergency major surgery.               ED Course as of 05/05/25 2021   Mon May 05, 2025   1947 Radiology has message me, patient has uncomplicated appendicitis.  Awaiting lab workup. [RT]   1950 Discussed with general surgery, patient will be admitted for further evaluation. [RT]      ED Course User Index  [RT] Candido Bland MD                           Clinical Impression:  Final diagnoses:  [R10.9] Abdominal pain, unspecified abdominal location (Primary)  [K37] Appendicitis, unspecified appendicitis type          ED Disposition Condition    Observation                   [1]   Social History  Tobacco Use    Smoking status: Every Day     Types: Cigarettes    Smokeless tobacco: Never    Substance Use Topics    Alcohol use: Yes    Drug use: No        Candido Bland MD  05/05/25 2021

## 2025-05-06 NOTE — H&P
[facility]  General Surgery  History & Physical    Patient Name: Hernandez Richmond  MRN: [id number]  Admission Date: 5/5/2025  Attending Physician: Scott Molina MD   Primary Care Provider: Safia, Primary Doctor    Patient information was obtained from patient and past medical records.     Subjective:     Chief Complaint:   Chief Complaint   Patient presents with    Abdominal Pain     RLQ abdominal pain that started yesterday afternoon. Endorses nausea, denies emesis. Tender to palpation.        History of Present Illness: 24 y.o. male with no prior PMH who presents with RLQ abdominal pain and nausea starting yesterday afternoon. He has never had pain like this before. He denies fevers and chills. He denies cardiac issues or history of stroke. Denies blood thinners and history of abdominal surgery.    CT scan with findings of acute appendicitis.  WBC of 15 with left shift.    No current facility-administered medications on file prior to encounter.     No current outpatient medications on file prior to encounter.       Review of patient's allergies indicates:  No Known Allergies    No past medical history on file.  No past surgical history on file.  Family History    None       Tobacco Use    Smoking status: Every Day     Types: Cigarettes    Smokeless tobacco: Never   Substance and Sexual Activity    Alcohol use: Yes    Drug use: No    Sexual activity: Not on file     Review of Systems   Constitutional:  Negative for chills and fever.   Respiratory:  Negative for shortness of breath.    Cardiovascular:  Negative for chest pain.   Gastrointestinal:  Positive for abdominal pain and nausea. Negative for diarrhea and vomiting.   Genitourinary:  Negative for dysuria.   Neurological:  Negative for dizziness and weakness.     Objective:     Vital Signs (Most Recent):  Temp: 99 °F (37.2 °C) (05/06/25 0403)  Pulse: 100 (05/06/25 0403)  Resp: 16 (05/06/25 0403)  BP: (!) 112/56 (05/06/25 0403)  SpO2: 98 % (05/06/25 0403)  Vital Signs (24h Range):  Temp:  [98.3 °F (36.8 °C)-99.3 °F (37.4 °C)] 99 °F (37.2 °C)  Pulse:  [100-122] 100  Resp:  [16-18] 16  SpO2:  [96 %-99 %] 98 %  BP: (112-130)/(56-80) 112/56     Weight: 76.2 kg (167 lb 15.9 oz)  Body mass index is 27.96 kg/m².     Physical Exam  Constitutional:       Appearance: Normal appearance.   HENT:      Head: Normocephalic and atraumatic.      Mouth/Throat:      Mouth: Mucous membranes are moist.      Pharynx: Oropharynx is clear.   Eyes:      Extraocular Movements: Extraocular movements intact.      Conjunctiva/sclera: Conjunctivae normal.   Pulmonary:      Effort: Pulmonary effort is normal.   Abdominal:      General: Abdomen is flat.      Palpations: Abdomen is soft.      Tenderness: There is abdominal tenderness (RLQ). There is guarding.   Musculoskeletal:         General: Normal range of motion.   Skin:     General: Skin is warm and dry.   Neurological:      General: No focal deficit present.      Mental Status: He is alert and oriented to person, place, and time.   Psychiatric:         Mood and Affect: Mood normal.         Behavior: Behavior normal.            I have reviewed all pertinent lab results within the past 24 hours.    Significant Diagnostics:  I have reviewed all pertinent imaging results/findings within the past 24 hours.    Assessment/Plan:     Appendicitis  24 y.o. male who presents with acute appendicitis.    Plan:  OR today for laparoscopic appendectomy  Consent signed  NPO  mIVF  Zosyn  SCDs  Anti-emetics prn  MMPC      VTE Risk Mitigation (From admission, onward)           Ordered     Place sequential compression device  Until discontinued         05/05/25 2001     IP VTE LOW RISK PATIENT  Once         05/05/25 2001                    Anastacio Fermin MD  General Surgery  [place] [facility]    Pt seen and examined.  Agree with note and plan.    Scott Molina M.D., F.A.C.S.  [id number] and General Surgery  [facility]

## 2025-05-06 NOTE — HPI
24 y.o. male with no prior PMH who presents with RLQ abdominal pain and nausea starting yesterday afternoon. He has never had pain like this before. He denies fevers and chills. He denies cardiac issues or history of stroke. Denies blood thinners and history of abdominal surgery.    CT scan with findings of acute appendicitis.  WBC of 15 with left shift.

## 2025-05-06 NOTE — ANESTHESIA PROCEDURE NOTES
Intubation    Date/Time: 5/6/2025 10:21 AM    Performed by: Ann-Marie Mcgovern CRNA  Authorized by: Ramiro Tamayo MD    Intubation:     Induction:  Intravenous    Intubated:  Postinduction    Mask Ventilation:  Easy mask    Attempts:  1    Attempted By:  Student and staff anesthesiologist (BEATRIZ Hall)    Method of Intubation:  Video laryngoscopy    Blade:  Marks 3    Laryngeal View Grade: Grade I - full view of cords      Difficult Airway Encountered?: No      Complications:  None    Airway Device:  Oral endotracheal tube    Airway Device Size:  7.5    Style/Cuff Inflation:  Cuffed    Inflation Amount (mL):  8    Tube secured:  22    Secured at:  The lips    Placement Verified By:  Chest x-ray, Colorimetric ETCO2 device and Capnometry    Complicating Factors:  None    Findings Post-Intubation:  BS equal bilateral and atraumatic/condition of teeth unchanged

## 2025-05-06 NOTE — PHARMACY MED REC
"  [facility]           Pharmacy  Admission Medication History     The home medication history was taken by Kalyn Norton.      Medication history obtained from Medications listed below were obtained from: [employer]- [employer]    Based on information gathered for medication list, you may go to "Admission" then "Reconcile Home Medications" tabs to review and/or act upon those items.     The home medication list has been updated by the Pharmacy department.   Please read ALL comments highlighted in yellow.   Please address this information as you see fit.    Feel free to contact us if you have any questions or require assistance.    The medications listed below were removed from the home medication list.  Please reorder if appropriate:    Patient reports NOT TAKING the following medication(s):  Bactrim ds      No current facility-administered medications on file prior to encounter.     Current Outpatient Medications on File Prior to Encounter   Medication Sig Dispense Refill       Please address this information as you see fit.  Feel free to contact us if you have any questions or require assistance.    Kalyn Norton  [phone]                .          "

## 2025-05-06 NOTE — ANESTHESIA PREPROCEDURE EVALUATION
[facility]  Anesthesia Pre-Operative Evaluation         Patient Name: Hernandez Richmond  YOB: 2000  MRN: [id number]    SUBJECTIVE:     Pre-operative evaluation for Procedure(s) (LRB):  APPENDECTOMY, LAPAROSCOPIC (N/A)     05/06/2025    Hernandez Richmond is a 24 y.o. male   Patient now presents for the above procedure(s).    TTE:   none documented.     Problem List[1]    Review of patient's allergies indicates:  No Known Allergies    Current Inpatient Medications:   acetaminophen  650 mg Oral Q6H    piperacillin-tazobactam (Zosyn) IV (PEDS and ADULTS) (extended infusion is not appropriate)  4.5 g Intravenous Q8H       Medications Ordered Prior to Encounter[2]    No past surgical history on file.    OBJECTIVE:     Vital Signs Range (Last 24H):  Temp:  [36.8 °C (98.3 °F)-37.4 °C (99.3 °F)]   Pulse:  [100-122]   Resp:  [16-18]   BP: (112-130)/(56-80)   SpO2:  [96 %-99 %]       Significant Labs:  Lab Results   Component Value Date    WBC 15.02 (H) 05/06/2025    HGB 14.7 05/06/2025    HCT 43.4 05/06/2025     05/06/2025    ALT 32 05/06/2025    AST 14 05/06/2025     05/06/2025    K 3.8 05/06/2025     05/06/2025    CREATININE 0.8 05/06/2025    BUN 8 05/06/2025    CO2 23 05/06/2025       Diagnostic Studies: No relevant studies.    EKG:   No results found for this or any previous visit.    ASSESSMENT/PLAN:           Pre-op Assessment    I have reviewed the Patient Summary Reports.     I have reviewed the Nursing Notes. I have reviewed the NPO Status.   I have reviewed the Medications.     Review of Systems  Anesthesia Hx:  No previous Anesthesia             Denies Family Hx of Anesthesia complications.    Denies Personal Hx of Anesthesia complications.                    Social:  Smoker, Recreational Drugs       Hematology/Oncology:  Hematology Normal   Oncology Normal                                   EENT/Dental:  EENT/Dental Normal           Cardiovascular:        Denies MI.   Denies CAD.    Denies CABG/stent.  Denies Dysrhythmias.                                      Pulmonary:  Pulmonary Normal                       Renal/:  Renal/ Normal                 Hepatic/GI:        Appendicitis   N/v             Neurological:  Neurology Normal                                      Endocrine:  Endocrine Normal            Psych:  Psychiatric Normal                    Physical Exam  General: Cooperative, Alert and Oriented    Airway:  Mallampati: II   Mouth Opening: Normal  TM Distance: Normal  Tongue: Normal  Neck ROM: Normal ROM    Dental:  Intact        Anesthesia Plan  Type of Anesthesia, risks & benefits discussed:    Anesthesia Type: Gen ETT  Intra-op Monitoring Plan: Standard ASA Monitors  Post Op Pain Control Plan: multimodal analgesia and IV/PO Opioids PRN  Induction:  rapid sequence  Airway Plan: Video, Post-Induction  Informed Consent: Informed consent signed with the Patient and all parties understand the risks and agree with anesthesia plan.  All questions answered.   ASA Score: 2  Day of Surgery Review of History & Physical: H&P Update referred to the surgeon/provider.    Ready For Surgery From Anesthesia Perspective.     .           [1]   Patient Active Problem List  Diagnosis    Appendicitis   [2]   No current facility-administered medications on file prior to encounter.     No current outpatient medications on file prior to encounter.

## 2025-05-06 NOTE — ANESTHESIA POSTPROCEDURE EVALUATION
Anesthesia Post Evaluation    Patient: Hernandez Richmond    Procedure(s) Performed: Procedure(s) (LRB):  APPENDECTOMY, LAPAROSCOPIC (N/A)    Final Anesthesia Type: general      Patient location during evaluation: [facility]  Patient participation: Yes- Able to Participate  Level of consciousness: awake and alert, oriented and awake  Post-procedure vital signs: reviewed and stable  Pain management: adequate  Airway patency: patent  NANCY mitigation strategies: Multimodal analgesia  PONV status at discharge: No PONV  Anesthetic complications: no      Cardiovascular status: blood pressure returned to baseline and hemodynamically stable  Respiratory status: unassisted and spontaneous ventilation  Hydration status: euvolemic  Follow-up not needed.              Vitals Value Taken Time   /66 05/06/25 12:37   Temp 36.6 °C (97.9 °F) 05/06/25 11:45   Pulse 81 05/06/25 12:39   Resp 15 05/06/25 12:39   SpO2 95 % 05/06/25 12:39   Vitals shown include unfiled device data.      No case tracking events are documented in the log.      Pain/Sudhir Score: Pain Rating Prior to Med Admin: 8 (5/6/2025  5:33 AM)  Pain Rating Post Med Admin: 2 (5/5/2025  9:16 PM)  Sudhir Score: 9 (5/6/2025 12:35 PM)

## 2025-05-06 NOTE — PROGRESS NOTES
"[facility] - Med Surg  Adult Nutrition  Progress Note    SUMMARY       Recommendations    1. Advance oral diet when appropriate as tolerated    2. Monitor labs and weights    Goals: Patient to advance with nutrition therapies within 48 hours if appropriate  Nutrition Goal Status: new  Communication of RD Recs: other (comment) (POC)    Nutrition Discharge Planning    Nutrition Discharge Planning: Too early to determine, pending clinical course    Assessment and Plan    Nutrition Problem  Altered GI Function    Related to (etiology):   Appendicitis with medical procedure    Signs and Symptoms (as evidenced by):   NPO    Interventions (treatment strategy):  Collaboration by nutrition professional with other providers    Nutrition Diagnosis Status:   New         Malnutrition Assessment             Patient does not meet positive criteria of NFPE at this time.  RD to continue to monitor for nutrition risks at follow up visits.                               Reason for Assessment    Reason For Assessment: identified at risk by screening criteria  Diagnosis:  (nausea, appendicitis)  Patient Active Problem List   Diagnosis    Appendicitis     No past medical history on file.    General Information Comments:   5/6/25: Patient is currently NPO for possible procedure.  Patient admitted with nausea and pain associated with appendicitis.  Labs reviewed.  NKFA. LBM: 5/4/25.  No significant weight loss found in medical chart weight history.  RD to continue to monitor NPO status, diet advancement and tolerance.    Nutrition/Diet History    Spiritual, Cultural Beliefs, Confucianist Practices, Values that Affect Care: no  Food Allergies: NKFA  Factors Affecting Nutritional Intake: NPO, pain, nausea/vomiting  Nutrition Related Social Determinants of Health: SDOH: Unable to assess at this time.     Anthropometrics    Height: 5' 5" (165.1 cm)  Height (inches): 65 in  Height Method: Stated  Weight: 76.2 kg (167 lb 15.9 oz)  Weight (lb): 167.99 " "lb  Weight Method: Bed Scale  Ideal Body Weight (IBW), Male: 136 lb  % Ideal Body Weight, Male (lb): 123.52 %  BMI (Calculated): 28  BMI Grade: 25 - 29.9 - overweight       Lab/Procedures/Meds    Pertinent Labs Reviewed: reviewed  BMP  Lab Results   Component Value Date     05/06/2025    K 3.8 05/06/2025     05/06/2025    CO2 23 05/06/2025    BUN 8 05/06/2025    CREATININE 0.8 05/06/2025    CALCIUM 9.4 05/06/2025    ANIONGAP 9 05/06/2025    EGFRNORACEVR >60 05/06/2025     No results found for: "LABA1C", "HGBA1C"  Lab Results   Component Value Date    CALCIUM 9.4 05/06/2025    PHOS 3.6 05/06/2025       Pertinent Medications Reviewed: reviewed  Scheduled Meds:   acetaminophen  650 mg Oral Q6H    piperacillin-tazobactam (Zosyn) IV (PEDS and ADULTS) (extended infusion is not appropriate)  4.5 g Intravenous Q8H     Continuous Infusions:   lactated ringers   Intravenous Continuous 100 mL/hr at 05/06/25 0004 New Bag at 05/06/25 0004     PRN Meds:.  Current Facility-Administered Medications:     LIDOcaine (PF) 10 mg/ml (1%), 1 mL, Intradermal, Once PRN    morphine, 2 mg, Intravenous, Q4H PRN    morphine, 4 mg, Intravenous, Q4H PRN    ondansetron, 4 mg, Intravenous, Q6H PRN    pneumoc 20-rambo conj-dip cr(PF), 0.5 mL, Intramuscular, Prior to discharge    prochlorperazine, 5 mg, Intravenous, Q6H PRN    sodium chloride 0.9%, 10 mL, Intravenous, PRN    Estimated/Assessed Needs    Weight Used For Calorie Calculations: 76.2 kg (167 lb 15.9 oz)  Energy Calorie Requirements (kcal): 25-30kcals/kg (1905-2286kcals/day)  Energy Need Method: Kcal/kg  Protein Requirements: 1.0g/kg (76g/day)  Weight Used For Protein Calculations: 76.2 kg (167 lb 15.9 oz)  Fluid Requirements (mL): 1ml/kcal  Estimated Fluid Requirement Method: RDA Method  RDA Method (mL): 25  CHO Requirement: 250      Nutrition Prescription Ordered    Current Diet Order: NPO    Evaluation of Received Nutrient/Fluid Intake    % Kcal Needs: NPO  % Protein Needs: " NPO  I/O: 5/6/25: +10.3L since admit  Energy Calories Required: not meeting needs  Protein Required: not meeting needs  Fluid Required: not meeting needs  Comments: LBM: 5/4/25  Tolerance: other (see comments) (NPO)  % Intake of Estimated Energy Needs: 0 - 25 %  % Meal Intake: NPO    PES Statement  Nutrition PES Status: New  Nutrition PES Problem: Inadequate energy intake  Nutrition PES Etiology: Altered GI function (appendicitis)  Nutrition PES Signs and Symptoms: NPO/CL status due to medical condition    Nutrition Risk    Level of Risk/Frequency of Follow-up: moderate (Follow up: 1-2x per week)     Monitor and Evaluation    Monitor and Evaluation: Food and beverage intake, Diet order, Weight, Beliefs and attitudes, Electrolyte and renal panel, Gastrointestinal profile, Glucose/endocrine profile, Lipid profile, Inflammatory profile     Nutrition Follow-Up    RD Follow-up?: Yes  Estela Dietz, MS, RDN, LDN

## 2025-05-06 NOTE — PLAN OF CARE
AAO x4. LR infusing via pump. NPO at midnight. Reports continued RLQ abdominal pain. Pt denies N/V, SOB, distress. Medications given per MAR. Vital signs stable. Ambulating independently. Surgery prep completed. Safety precautions maintained. Bed alarm set. Call bell within reach. Patient encouraged to call for assistance.      Problem: Adult Inpatient Plan of Care  Goal: Optimal Comfort and Wellbeing  Outcome: Ongoing     Problem: Infection  Goal: Absence of Infection Signs and Symptoms  Outcome: Ongoing     Problem: Pain Acute  Goal: Optimal Pain Control and Function  Outcome: Ongoing

## 2025-05-06 NOTE — PLAN OF CARE
AAOx4. Tolerated meals. Sx today, no c/o pain. VSS. Safety m/t.     Problem: Adult Inpatient Plan of Care  Goal: Plan of Care Review  Outcome: Progressing  Goal: Patient-Specific Goal (Individualized)  Outcome: Progressing     Problem: Appendectomy  Goal: Absence of Bleeding  Outcome: Progressing  Goal: Effective Bowel Elimination  Outcome: Progressing  Goal: Fluid and Electrolyte Balance  Outcome: Progressing  Goal: Absence of Infection Signs and Symptoms  Outcome: Progressing  Goal: Acceptable Pain Control  Outcome: Progressing

## 2025-05-06 NOTE — NURSING
Pt back from sx. Drowsy but arouses to voice. VSS. Regula diet ordered. Pt w/o appetite. CM at bedside.

## 2025-05-06 NOTE — TRANSFER OF CARE
"Anesthesia Transfer of Care Note    Patient: Hernandez Richmond    Procedure(s) Performed: Procedure(s) (LRB):  APPENDECTOMY, LAPAROSCOPIC (N/A)    Patient location: PACU    Anesthesia Type: general    Transport from OR: Transported from OR on 6-10 L/min O2 by face mask with adequate spontaneous ventilation    Post pain: adequate analgesia    Post assessment: no apparent anesthetic complications and tolerated procedure well    Post vital signs: stable    Level of consciousness: awake, alert and oriented    Nausea/Vomiting: no nausea/vomiting    Complications: none    Transfer of care protocol was followed      Last vitals: Visit Vitals  BP (!) 115/59   Pulse 74   Temp 36.9 °C (98.5 °F)   Resp 18   Ht 5' 5" (1.651 m)   Wt 76.2 kg (167 lb 15.9 oz)   SpO2 99%   BMI 27.96 kg/m²     "

## 2025-05-06 NOTE — SUBJECTIVE & OBJECTIVE
No current facility-administered medications on file prior to encounter.     No current outpatient medications on file prior to encounter.       Review of patient's allergies indicates:  No Known Allergies    No past medical history on file.  No past surgical history on file.  Family History    None       Tobacco Use    Smoking status: Every Day     Types: Cigarettes    Smokeless tobacco: Never   Substance and Sexual Activity    Alcohol use: Yes    Drug use: No    Sexual activity: Not on file     Review of Systems   Constitutional:  Negative for chills and fever.   Respiratory:  Negative for shortness of breath.    Cardiovascular:  Negative for chest pain.   Gastrointestinal:  Positive for abdominal pain and nausea. Negative for diarrhea and vomiting.   Genitourinary:  Negative for dysuria.   Neurological:  Negative for dizziness and weakness.     Objective:     Vital Signs (Most Recent):  Temp: 99 °F (37.2 °C) (05/06/25 0403)  Pulse: 100 (05/06/25 0403)  Resp: 16 (05/06/25 0403)  BP: (!) 112/56 (05/06/25 0403)  SpO2: 98 % (05/06/25 0403) Vital Signs (24h Range):  Temp:  [98.3 °F (36.8 °C)-99.3 °F (37.4 °C)] 99 °F (37.2 °C)  Pulse:  [100-122] 100  Resp:  [16-18] 16  SpO2:  [96 %-99 %] 98 %  BP: (112-130)/(56-80) 112/56     Weight: 76.2 kg (167 lb 15.9 oz)  Body mass index is 27.96 kg/m².     Physical Exam  Constitutional:       Appearance: Normal appearance.   HENT:      Head: Normocephalic and atraumatic.      Mouth/Throat:      Mouth: Mucous membranes are moist.      Pharynx: Oropharynx is clear.   Eyes:      Extraocular Movements: Extraocular movements intact.      Conjunctiva/sclera: Conjunctivae normal.   Pulmonary:      Effort: Pulmonary effort is normal.   Abdominal:      General: Abdomen is flat.      Palpations: Abdomen is soft.      Tenderness: There is abdominal tenderness (RLQ). There is guarding.   Musculoskeletal:         General: Normal range of motion.   Skin:     General: Skin is warm and dry.    Neurological:      General: No focal deficit present.      Mental Status: He is alert and oriented to person, place, and time.   Psychiatric:         Mood and Affect: Mood normal.         Behavior: Behavior normal.            I have reviewed all pertinent lab results within the past 24 hours.    Significant Diagnostics:  I have reviewed all pertinent imaging results/findings within the past 24 hours.

## 2025-05-06 NOTE — PLAN OF CARE
Nutrition Plan of Care:    Recommendations     1. Advance oral diet when appropriate as tolerated    2. Monitor labs and weights     Goals: Patient to advance with nutrition therapies within 48 hours if appropriate  Nutrition Goal Status: new  Communication of RD Recs: other (comment) (POC)     Nutrition Discharge Planning     Nutrition Discharge Planning: Too early to determine, pending clinical course     Assessment and Plan     Nutrition Problem  Altered GI Function     Related to (etiology):   Appendicitis with medical procedure     Signs and Symptoms (as evidenced by):   NPO     Interventions (treatment strategy):  Collaboration by nutrition professional with other providers     Nutrition Diagnosis Status:   New

## 2025-05-07 VITALS
HEART RATE: 78 BPM | OXYGEN SATURATION: 98 % | SYSTOLIC BLOOD PRESSURE: 116 MMHG | DIASTOLIC BLOOD PRESSURE: 64 MMHG | WEIGHT: 168 LBS | HEIGHT: 65 IN | TEMPERATURE: 99 F | RESPIRATION RATE: 18 BRPM | BODY MASS INDEX: 27.99 KG/M2

## 2025-05-07 LAB
ABSOLUTE EOSINOPHIL (OHS): 0 K/UL
ABSOLUTE MONOCYTE (OHS): 0.9 K/UL (ref 0.3–1)
ABSOLUTE NEUTROPHIL COUNT (OHS): 11.06 K/UL (ref 1.8–7.7)
ALBUMIN SERPL BCP-MCNC: 3.6 G/DL (ref 3.5–5.2)
ALP SERPL-CCNC: 67 UNIT/L (ref 40–150)
ALT SERPL W/O P-5'-P-CCNC: 39 UNIT/L (ref 10–44)
ANION GAP (OHS): 8 MMOL/L (ref 8–16)
AST SERPL-CCNC: 21 UNIT/L (ref 11–45)
BASOPHILS # BLD AUTO: 0.02 K/UL
BASOPHILS NFR BLD AUTO: 0.1 %
BILIRUB SERPL-MCNC: 0.7 MG/DL (ref 0.1–1)
BUN SERPL-MCNC: 9 MG/DL (ref 6–20)
CALCIUM SERPL-MCNC: 9.3 MG/DL (ref 8.7–10.5)
CHLORIDE SERPL-SCNC: 109 MMOL/L (ref 95–110)
CO2 SERPL-SCNC: 22 MMOL/L (ref 23–29)
CREAT SERPL-MCNC: 0.7 MG/DL (ref 0.5–1.4)
ERYTHROCYTE [DISTWIDTH] IN BLOOD BY AUTOMATED COUNT: 11.9 % (ref 11.5–14.5)
GFR SERPLBLD CREATININE-BSD FMLA CKD-EPI: >60 ML/MIN/1.73/M2
GLUCOSE SERPL-MCNC: 110 MG/DL (ref 70–110)
HCT VFR BLD AUTO: 40.2 % (ref 40–54)
HGB BLD-MCNC: 13.8 GM/DL (ref 14–18)
IMM GRANULOCYTES # BLD AUTO: 0.06 K/UL (ref 0–0.04)
IMM GRANULOCYTES NFR BLD AUTO: 0.4 % (ref 0–0.5)
LYMPHOCYTES # BLD AUTO: 1.69 K/UL (ref 1–4.8)
MAGNESIUM SERPL-MCNC: 2.2 MG/DL (ref 1.6–2.6)
MCH RBC QN AUTO: 30 PG (ref 27–31)
MCHC RBC AUTO-ENTMCNC: 34.3 G/DL (ref 32–36)
MCV RBC AUTO: 87 FL (ref 82–98)
NUCLEATED RBC (/100WBC) (OHS): 0 /100 WBC
PHOSPHATE SERPL-MCNC: 4.3 MG/DL (ref 2.7–4.5)
PLATELET # BLD AUTO: 228 K/UL (ref 150–450)
PMV BLD AUTO: 9.5 FL (ref 9.2–12.9)
POTASSIUM SERPL-SCNC: 3.8 MMOL/L (ref 3.5–5.1)
PROT SERPL-MCNC: 7.4 GM/DL (ref 6–8.4)
RBC # BLD AUTO: 4.6 M/UL (ref 4.6–6.2)
RELATIVE EOSINOPHIL (OHS): 0 %
RELATIVE LYMPHOCYTE (OHS): 12.3 % (ref 18–48)
RELATIVE MONOCYTE (OHS): 6.6 % (ref 4–15)
RELATIVE NEUTROPHIL (OHS): 80.6 % (ref 38–73)
SODIUM SERPL-SCNC: 139 MMOL/L (ref 136–145)
WBC # BLD AUTO: 13.73 K/UL (ref 3.9–12.7)

## 2025-05-07 PROCEDURE — 83735 ASSAY OF MAGNESIUM: CPT | Performed by: SURGERY

## 2025-05-07 PROCEDURE — 36415 COLL VENOUS BLD VENIPUNCTURE: CPT | Performed by: SURGERY

## 2025-05-07 PROCEDURE — 84100 ASSAY OF PHOSPHORUS: CPT | Performed by: SURGERY

## 2025-05-07 PROCEDURE — 85025 COMPLETE CBC W/AUTO DIFF WBC: CPT | Performed by: SURGERY

## 2025-05-07 PROCEDURE — G0378 HOSPITAL OBSERVATION PER HR: HCPCS

## 2025-05-07 PROCEDURE — 80053 COMPREHEN METABOLIC PANEL: CPT | Performed by: SURGERY

## 2025-05-07 RX ORDER — OXYCODONE HYDROCHLORIDE 5 MG/1
5 TABLET ORAL EVERY 4 HOURS PRN
Qty: 15 TABLET | Refills: 0 | Status: SHIPPED | OUTPATIENT
Start: 2025-05-07

## 2025-05-07 NOTE — PROGRESS NOTES
The pt's wife is transporting him home at d/c. The sw stressed the importance of the pt going to his hsp f/u's and taking his medications. The pt acknowledged understanding and states he will comply. The pt has no further questions or Case Management needs. The pt is cleared to d/c once his transport arrives.     Future Appointments   Date Time Provider Department Center   5/20/2025  9:00 AM Scott Molina MD [facility]    [facility]  Discharge Final Note    Primary Care Provider: No, Primary Doctor    Expected Discharge Date: 5/7/2025    Final Discharge Note (most recent)       Final Note - 05/06/25 1304          Final Note    Assessment Type Discharge Planning Brief Assessment                     Important Message from Medicare             Contact Info       Scott Molina MD   Specialty: General Surgery    [address]  [address]  [place] [address]   Phone: [phone]       Next Steps: Follow up on 5/20/2025    Instructions: 9:00am [facility] F/U DX:2 wk p/o Lap-Appy 5/6    [facility]    [address]  [place] [address]   Phone: [phone]       Next Steps: Follow up    Instructions: The pt has been instructed to call the number listed above to establish care with the clinic.

## 2025-05-07 NOTE — PLAN OF CARE
VN reviewed discharge instructions with pt. Using  Olga Lidia #[id number]. Using teach back method.  AVS printed and handed to pt by bedside nurse.  Reviewed follow-up appointments, medications, diet, and importance of medication compliance.  Reviewed home care instructions, treatment plan, self-management, and when to seek medical attention.  Allowed time for questions.  All questions answered.  Patient verbalized complete understanding of discharge instructions and voices no concerns.     Discharge instructions complete. Pt waiting on  Bedside delivery and ride home. Bedside nurse notified.

## 2025-05-07 NOTE — PLAN OF CARE
Problem: Adult Inpatient Plan of Care  Goal: Optimal Comfort and Wellbeing  Outcome: Progressing  Goal: Readiness for Transition of Care  Outcome: Progressing     Problem: Appendectomy  Goal: Acceptable Pain Control  Outcome: Progressing

## 2025-05-07 NOTE — HOSPITAL COURSE
Patient presented with acute appendicitis and underwent laparoscopic appendectomy on 5/6. He progressed well post op. He tolerated a regular diet and his pain was controlled with PO meds. He is now stable for discharge.

## 2025-05-07 NOTE — DISCHARGE SUMMARY
[facility]  General Surgery  Discharge Summary      Patient Name: Hernandez Richmond  MRN: [id number]  Admission Date: 5/5/2025  Hospital Length of Stay: 0 days  Discharge Date and Time: 05/07/2025 11:06 AM  Attending Physician: Scott Molina MD   Discharging Provider: Anastacio Fermin MD  Primary Care Provider: Safia, Primary Doctor    HPI:   24 y.o. male with no prior PMH who presents with RLQ abdominal pain and nausea starting yesterday afternoon. He has never had pain like this before. He denies fevers and chills. He denies cardiac issues or history of stroke. Denies blood thinners and history of abdominal surgery.    CT scan with findings of acute appendicitis.  WBC of 15 with left shift.    Procedure(s) (LRB):  APPENDECTOMY, LAPAROSCOPIC (N/A)      Indwelling Lines/Drains at time of discharge:   Lines/Drains/Airways       None                 Hospital Course: Patient presented with acute appendicitis and underwent laparoscopic appendectomy on 5/6. He progressed well post op. He tolerated a regular diet and his pain was controlled with PO meds. He is now stable for discharge.    Goals of Care Treatment Preferences:  Code Status: Full Code    Physical Exam  Constitutional:       Appearance: Normal appearance.   HENT:      Head: Normocephalic and atraumatic.      Mouth/Throat:      Mouth: Mucous membranes are moist.      Pharynx: Oropharynx is clear.   Eyes:      Extraocular Movements: Extraocular movements intact.      Conjunctiva/sclera: Conjunctivae normal.   Pulmonary:      Effort: Pulmonary effort is normal.   Abdominal:      General: Abdomen is flat.      Palpations: Abdomen is soft.      Comments: Port site incisions CDI with dermabond in place   Musculoskeletal:         General: Normal range of motion.      Cervical back: Normal range of motion.   Skin:     General: Skin is warm and dry.   Neurological:      General: No focal deficit present.      Mental Status: He is alert and oriented to person, place,  and time.   Psychiatric:         Mood and Affect: Mood normal.         Behavior: Behavior normal.          Consults: None    Significant Diagnostic Studies: N/A    Pending Diagnostic Studies:       None          Final Active Diagnoses:    Diagnosis Date Noted POA    PRINCIPAL PROBLEM:  Appendicitis [K37] 05/06/2025 Yes      Problems Resolved During this Admission:      Discharged Condition: good    Disposition: Home or Self Care    Follow Up:   Follow-up Information       Scott Molina MD Follow up on 5/20/2025.    Specialty: General Surgery  Why: 9:00am [facility] F/U DX:2 wk p/o Lap-Appy 5/6  Contact information:  [address]  [address]  [place] [address] [phone]               Care, Inclusive Follow up.    Why: The pt has been instructed to call the number listed above to establish care with the clinic.  Contact information:  [address] [place] [address] [phone]                           Patient Instructions:      Lifting restrictions   Order Comments: Do not lift anything >10 lbs (about a gallon of milk) for 4 weeks     No driving until:   Order Comments: No longer taking narcotic pain medication and can turn around safely without pain.     Notify your health care provider if you experience any of the following:  temperature >100.4     Notify your health care provider if you experience any of the following:  persistent nausea and vomiting or diarrhea     Notify your health care provider if you experience any of the following:  severe uncontrolled pain     Notify your health care provider if you experience any of the following:  redness, tenderness, or signs of infection (pain, swelling, redness, odor or green/yellow discharge around incision site)     Notify your health care provider if you experience any of the following:  worsening rash     No dressing needed   Order Comments: Okay to shower the day after surgery. Please do not submerge wounds underwater (no bath, no pool, no lake, etc.)  for at least 2 weeks. There is glue over your incisions that will peel off on its own in 1-2 weeks.     Activity as tolerated     Medications:  Reconciled Home Medications:      Medication List        START taking these medications      oxyCODONE 5 MG immediate release tablet  Commonly known as: ROXICODONE  [place] 1 tableta (5 mg en total) por vía oral cada 4 (cuatro) horas según sea necesario para el dolor.  (Take 1 tablet (5 mg total) by mouth every 4 (four) hours as needed for Pain.)            Time spent on the discharge of patient: 15 minutes    Anastacio Fermin MD  General Surgery  [facility]

## 2025-05-08 LAB
ESTROGEN SERPL-MCNC: NORMAL PG/ML
INSULIN SERPL-ACNC: NORMAL U[IU]/ML
LAB AP CLINICAL INFORMATION: NORMAL
LAB AP GROSS DESCRIPTION: NORMAL
LAB AP PERFORMING LOCATION(S): NORMAL
LAB AP REPORT FOOTNOTES: NORMAL

## 2025-05-20 ENCOUNTER — OFFICE VISIT (OUTPATIENT)
Dept: SURGERY | Facility: CLINIC | Age: 25
End: 2025-05-20
Payer: COMMERCIAL

## 2025-05-20 VITALS
DIASTOLIC BLOOD PRESSURE: 78 MMHG | HEIGHT: 65 IN | TEMPERATURE: 98 F | SYSTOLIC BLOOD PRESSURE: 117 MMHG | WEIGHT: 167.69 LBS | HEART RATE: 79 BPM | BODY MASS INDEX: 27.94 KG/M2

## 2025-05-20 DIAGNOSIS — K35.30 ACUTE APPENDICITIS WITH LOCALIZED PERITONITIS, WITHOUT PERFORATION, ABSCESS, OR GANGRENE: Primary | ICD-10-CM

## 2025-05-20 PROCEDURE — 1159F MED LIST DOCD IN RCRD: CPT | Mod: CPTII,S$GLB,, | Performed by: SURGERY

## 2025-05-20 PROCEDURE — 3074F SYST BP LT 130 MM HG: CPT | Mod: CPTII,S$GLB,, | Performed by: SURGERY

## 2025-05-20 PROCEDURE — 99999 PR PBB SHADOW E&M-EST. PATIENT-LVL III: CPT | Mod: PBBFAC,,, | Performed by: SURGERY

## 2025-05-20 PROCEDURE — 3078F DIAST BP <80 MM HG: CPT | Mod: CPTII,S$GLB,, | Performed by: SURGERY

## 2025-05-20 PROCEDURE — 99024 POSTOP FOLLOW-UP VISIT: CPT | Mod: S$GLB,,, | Performed by: SURGERY

## 2025-05-20 NOTE — PROGRESS NOTES
[facility]  POST-OP NOTE    HPI: Hernandez Richmond is a 24 y.o. male s/p lap appy.  Doing well.  No complaints.  Pre-op symptoms resolved.      VITALS:  Vitals:    05/20/25 0847   BP: 117/78   Pulse: 79   Temp: 97.5 °F (36.4 °C)       PHYSICAL EXAM:  GEN: NAD  HEENT: NCAT  ABD: incisions C/D/I    PATHOLOGY:  Reviewed      ASSESSMENT & PLAN:  24 y.o. male s/p lap appy.  RTC as needed.        Scott Molina M.D., F.A.C.S.  Rdmcpp-Xcjyzwn[facility] and General Surgery  [facility] [place]

## (undated) DEVICE — PENCIL SMK EVAC CONNECTOR 10FT

## (undated) DEVICE — SUT MONOCYRL 4-0 PS2 UND

## (undated) DEVICE — GLOVE SURGICAL LATEX SZ 8

## (undated) DEVICE — DISSECTOR EPIX LAPA 5MMX35CM

## (undated) DEVICE — SUT VICRYL+ 27 UR-6 VIOL

## (undated) DEVICE — SEALER LIGASURE LAP 37CM 5MM

## (undated) DEVICE — BAG TISSUE RETRIEVAL 225ML

## (undated) DEVICE — TROCAR ENDOPATH XCEL 5MM 7.5CM

## (undated) DEVICE — CONTAINER MULTIPURPOSE/SPECIME

## (undated) DEVICE — ELECTRODE REM PLYHSV RETURN 9

## (undated) DEVICE — R SEALER LIGASURE LAP 37CM 5MM

## (undated) DEVICE — SUPPORT ULNA NERVE PROTECTOR

## (undated) DEVICE — TROCAR ENDOPATH XCEL 12X100MM

## (undated) DEVICE — MANIFOLD 4 PORT

## (undated) DEVICE — NDL HYPO REG 25G X 1 1/2

## (undated) DEVICE — Device

## (undated) DEVICE — CART STAPLE FLEX ETX 3.5MM BLU

## (undated) DEVICE — STAPLER INT LINEAR ARTC 3.5-45

## (undated) DEVICE — HEMOSTAT SURGICEL 4X8IN